# Patient Record
Sex: MALE | Race: WHITE | NOT HISPANIC OR LATINO | URBAN - METROPOLITAN AREA
[De-identification: names, ages, dates, MRNs, and addresses within clinical notes are randomized per-mention and may not be internally consistent; named-entity substitution may affect disease eponyms.]

---

## 2020-07-25 ENCOUNTER — TELEPHONE ENCOUNTER (OUTPATIENT)
Dept: URBAN - METROPOLITAN AREA CLINIC 13 | Facility: CLINIC | Age: 70
End: 2020-07-25

## 2020-07-26 ENCOUNTER — TELEPHONE ENCOUNTER (OUTPATIENT)
Dept: URBAN - METROPOLITAN AREA CLINIC 13 | Facility: CLINIC | Age: 70
End: 2020-07-26

## 2020-07-26 RX ORDER — ASPIRIN 81 MG/1
TAKE 3 TABLET WEEKLY TABLET ORAL
Refills: 0 | Status: ACTIVE | COMMUNITY
Start: 2016-07-13

## 2020-07-26 RX ORDER — FEXOFENADINE HYDROCHLORIDE 180 MG/1
TAKE 1 TABLET DAILY AS NEEDED TABLET, FILM COATED ORAL
Refills: 0 | Status: ACTIVE | COMMUNITY
Start: 2016-07-13

## 2020-07-26 RX ORDER — MONTELUKAST SODIUM 10 MG/1
TAKE 1 TABLET DAILY TABLET, FILM COATED ORAL
Refills: 0 | Status: ACTIVE | COMMUNITY

## 2021-09-10 ENCOUNTER — TELEPHONE ENCOUNTER (OUTPATIENT)
Dept: URBAN - METROPOLITAN AREA CLINIC 113 | Facility: CLINIC | Age: 71
End: 2021-09-10

## 2021-11-17 ENCOUNTER — LAB OUTSIDE AN ENCOUNTER (OUTPATIENT)
Dept: URBAN - METROPOLITAN AREA CLINIC 113 | Facility: CLINIC | Age: 71
End: 2021-11-17

## 2021-11-17 ENCOUNTER — TELEPHONE ENCOUNTER (OUTPATIENT)
Dept: URBAN - METROPOLITAN AREA CLINIC 113 | Facility: CLINIC | Age: 71
End: 2021-11-17

## 2021-11-17 VITALS — HEIGHT: 77 IN | BODY MASS INDEX: 26.57 KG/M2 | WEIGHT: 225 LBS

## 2021-11-17 RX ORDER — MONTELUKAST SODIUM 10 MG/1
TAKE 1 TABLET DAILY TABLET, FILM COATED ORAL
Refills: 0 | Status: ACTIVE | COMMUNITY

## 2021-11-17 RX ORDER — ASPIRIN 81 MG/1
TAKE 3 TABLET WEEKLY TABLET ORAL
Refills: 0 | Status: DISCONTINUED | COMMUNITY
Start: 2016-07-13

## 2021-11-17 RX ORDER — FEXOFENADINE HYDROCHLORIDE 180 MG/1
TAKE 1 TABLET DAILY AS NEEDED TABLET, FILM COATED ORAL
Refills: 0 | Status: ACTIVE | COMMUNITY
Start: 2016-07-13

## 2021-11-17 RX ORDER — AZELASTINE HYDROCHLORIDE AND FLUTICASONE PROPIONATE 137; 50 UG/1; UG/1
1 SPRAY IN EACH NOSTRIL SPRAY, METERED NASAL TWICE A DAY
Status: ACTIVE | COMMUNITY

## 2021-11-17 RX ORDER — OMEPRAZOLE 40 MG/1
1 CAPSULE 30 MINUTES BEFORE MORNING MEAL CAPSULE, DELAYED RELEASE ORAL ONCE A DAY
Status: ACTIVE | COMMUNITY

## 2021-11-17 RX ORDER — LISINOPRIL 5 MG/1
1 TABLET TABLET ORAL ONCE A DAY
Status: ACTIVE | COMMUNITY

## 2021-11-17 RX ORDER — POLYETHYLENE GLYCOL 3350, SODIUM CHLORIDE, SODIUM BICARBONATE, POTASSIUM CHLORIDE 420; 11.2; 5.72; 1.48 G/4L; G/4L; G/4L; G/4L
AS DIRECTED POWDER, FOR SOLUTION ORAL
Qty: 1 | Refills: 0 | OUTPATIENT
Start: 2021-11-17 | End: 2021-11-18

## 2022-01-24 ENCOUNTER — OFFICE VISIT (OUTPATIENT)
Dept: URBAN - METROPOLITAN AREA SURGERY CENTER 25 | Facility: SURGERY CENTER | Age: 72
End: 2022-01-24

## 2022-03-29 ENCOUNTER — OFFICE VISIT (OUTPATIENT)
Dept: URBAN - METROPOLITAN AREA SURGERY CENTER 25 | Facility: SURGERY CENTER | Age: 72
End: 2022-03-29

## 2022-05-02 ENCOUNTER — OFFICE VISIT (OUTPATIENT)
Dept: URBAN - METROPOLITAN AREA SURGERY CENTER 25 | Facility: SURGERY CENTER | Age: 72
End: 2022-05-02
Payer: MEDICARE

## 2022-05-02 DIAGNOSIS — Z86.010 ADENOMAS PERSONAL HISTORY OF COLONIC POLYPS: ICD-10-CM

## 2022-05-02 PROCEDURE — G8907 PT DOC NO EVENTS ON DISCHARG: HCPCS | Performed by: INTERNAL MEDICINE

## 2022-05-02 PROCEDURE — G0105 COLORECTAL SCRN; HI RISK IND: HCPCS | Performed by: INTERNAL MEDICINE

## 2022-05-16 ENCOUNTER — CLAIMS CREATED FROM THE CLAIM WINDOW (OUTPATIENT)
Dept: URBAN - METROPOLITAN AREA CLINIC 113 | Facility: CLINIC | Age: 72
End: 2022-05-16
Payer: MEDICARE

## 2022-05-16 VITALS
HEIGHT: 77 IN | TEMPERATURE: 98.2 F | SYSTOLIC BLOOD PRESSURE: 120 MMHG | BODY MASS INDEX: 28.22 KG/M2 | WEIGHT: 239 LBS | DIASTOLIC BLOOD PRESSURE: 74 MMHG | HEART RATE: 66 BPM

## 2022-05-16 DIAGNOSIS — Z86.010 PERSONAL HISTORY OF COLONIC POLYPS: ICD-10-CM

## 2022-05-16 DIAGNOSIS — K57.50 DIVERTICULOSIS OF BOTH SMALL AND LARGE INTESTINE: ICD-10-CM

## 2022-05-16 DIAGNOSIS — K64.8 INTERNAL HEMORRHOIDS: ICD-10-CM

## 2022-05-16 DIAGNOSIS — K57.90 DIVERTICULOSIS: ICD-10-CM

## 2022-05-16 PROBLEM — 398050005 DIVERTICULAR DISEASE OF COLON: Status: ACTIVE | Noted: 2022-05-16

## 2022-05-16 PROBLEM — 428283002 HISTORY OF POLYP OF COLON (SITUATION): Status: ACTIVE | Noted: 2021-11-17

## 2022-05-16 PROCEDURE — 99213 OFFICE O/P EST LOW 20 MIN: CPT | Performed by: NURSE PRACTITIONER

## 2022-05-16 RX ORDER — PANTOPRAZOLE SODIUM 40 MG/1
1 TABLET TABLET, DELAYED RELEASE ORAL ONCE A DAY
Status: ACTIVE | COMMUNITY

## 2022-05-16 RX ORDER — FEXOFENADINE HYDROCHLORIDE 180 MG/1
TAKE 1 TABLET DAILY AS NEEDED TABLET, FILM COATED ORAL
Refills: 0 | Status: ACTIVE | COMMUNITY
Start: 2016-07-13

## 2022-05-16 RX ORDER — MONTELUKAST SODIUM 10 MG/1
TAKE 1 TABLET DAILY TABLET, FILM COATED ORAL
Refills: 0 | Status: ACTIVE | COMMUNITY

## 2022-05-16 RX ORDER — LOSARTAN POTASSIUM 25 MG/1
1 TABLET TABLET ORAL ONCE A DAY
Status: ACTIVE | COMMUNITY

## 2022-05-16 NOTE — HPI-TODAY'S VISIT:
71-year-old male with a history of hypertension, adenomatous colon polyps, presenting for follow-up after surveillance colonoscopy. Colonoscopy 5/2/2022:Mild to moderate sigmoid diverticulosis, mild internal hemorrhoids, moderate external hemorrhoids.  Repeat colonoscopy recommended in 5 years. He is doing well from a GI stanpoint. His bowel habits are regular and he denies blood per rectum. No abdominal pain, nausea or vomiting.

## 2022-12-06 ENCOUNTER — WEB ENCOUNTER (OUTPATIENT)
Dept: URBAN - METROPOLITAN AREA CLINIC 113 | Facility: CLINIC | Age: 72
End: 2022-12-06

## 2022-12-28 ENCOUNTER — OFFICE VISIT (OUTPATIENT)
Dept: URBAN - METROPOLITAN AREA CLINIC 107 | Facility: CLINIC | Age: 72
End: 2022-12-28
Payer: MEDICARE

## 2022-12-28 VITALS
WEIGHT: 247 LBS | HEIGHT: 77 IN | DIASTOLIC BLOOD PRESSURE: 72 MMHG | BODY MASS INDEX: 29.16 KG/M2 | TEMPERATURE: 98.2 F | SYSTOLIC BLOOD PRESSURE: 125 MMHG | HEART RATE: 75 BPM

## 2022-12-28 DIAGNOSIS — E88.01 ALPHA-1-ANTITRYPSIN DEFICIENCY: ICD-10-CM

## 2022-12-28 DIAGNOSIS — R05.3 CHRONIC COUGH: ICD-10-CM

## 2022-12-28 DIAGNOSIS — K59.00 CONSTIPATION, UNSPECIFIED CONSTIPATION TYPE: ICD-10-CM

## 2022-12-28 DIAGNOSIS — K21.00 ALKALINE REFLUX ESOPHAGITIS: ICD-10-CM

## 2022-12-28 DIAGNOSIS — K64.8 INTERNAL HEMORRHOIDS WITH COMPLICATION: ICD-10-CM

## 2022-12-28 DIAGNOSIS — K21.9 GERD: ICD-10-CM

## 2022-12-28 DIAGNOSIS — K59.09 CHANGE IN BOWEL MOVEMENTS INTERMITTENT CONSTIPATION. URGENCY IN THE MORNING.: ICD-10-CM

## 2022-12-28 PROBLEM — 14760008 CONSTIPATION: Status: ACTIVE | Noted: 2022-12-28

## 2022-12-28 PROBLEM — 30188007 ALPHA-1-ANTITRYPSIN DEFICIENCY: Status: ACTIVE | Noted: 2022-12-28

## 2022-12-28 PROBLEM — 235595009 GASTROESOPHAGEAL REFLUX DISEASE: Status: ACTIVE | Noted: 2022-12-28

## 2022-12-28 PROCEDURE — 76700 US EXAM ABDOM COMPLETE: CPT | Performed by: NURSE PRACTITIONER

## 2022-12-28 PROCEDURE — 99214 OFFICE O/P EST MOD 30 MIN: CPT | Performed by: NURSE PRACTITIONER

## 2022-12-28 RX ORDER — LOSARTAN POTASSIUM 25 MG/1
1 TABLET TABLET ORAL ONCE A DAY
Status: ACTIVE | COMMUNITY

## 2022-12-28 RX ORDER — OMEPRAZOLE 40 MG/1
1 CAPSULE 30 MINUTES BEFORE MORNING MEAL CAPSULE, DELAYED RELEASE ORAL ONCE A DAY
Status: ACTIVE | COMMUNITY

## 2022-12-28 RX ORDER — FAMOTIDINE 40 MG/1
1 TABLET AT BEDTIME TABLET, FILM COATED ORAL ONCE A DAY
Status: ACTIVE | COMMUNITY

## 2022-12-28 RX ORDER — MONTELUKAST SODIUM 10 MG/1
TAKE 1 TABLET DAILY TABLET, FILM COATED ORAL
Refills: 0 | Status: ACTIVE | COMMUNITY

## 2022-12-28 RX ORDER — FEXOFENADINE HYDROCHLORIDE 180 MG/1
TAKE 1 TABLET DAILY AS NEEDED TABLET, FILM COATED ORAL
Refills: 0 | Status: ACTIVE | COMMUNITY
Start: 2016-07-13

## 2022-12-28 NOTE — HPI-TODAY'S VISIT:
72-year-old male with a history of hypertension, adenomatous colon polyps, presenting for evaluation of hemorrhoids. He presents today with multiple abdominal complaints and various questions. His priority regards a recent diagnosis of alpha-1 anti-trypsin deficiency, identified through routine testing after his sister was diagnosed. He has seen pulmonology and provides a negative chest xray aside from questionable changes of emphysema, and normal pulmonary function tests. He had labs on 12/19/22 which show normal LFTs with AST 23, ALT 23, , Tbili 0.2. Labs on 12/5/22 showed a low alpha-1 antitrypsin level of 63, phenotype SZ. He also complains of a chronic cough which has been ongoing for the last year. He is following with ENT, Dr. Real, and states the cough has been attributed to silent reflux. He has been on omeprazole 40mg daily for over a year without change. He started famotidien 40mg nightly about 2 weeks ago without much noticeable difference to this point. He has tried Allegra and Claritin without change. His Lisinopril was also stopped without improvement. He recently had an upper respiratory infection and feels he has some lingering symptoms related to this, so is unsure if recent medication changes have been helpful. The cough occurs throughout the day, but not at night when he is on his CPAP. The cough does not seem to be associated with meals. He has rare heartburn. No dysphagia. He does admit to a significant amount of postnasal drip. In addition, he has experienced intermittent hemorrhoid irritation over the last several months. About once per month, following a hard stool, he will experience burning perianal discomfort and a small volume of red blood with wiping. He will use preparation H and Tucks pads for a few days and the symptoms will subside. He tries to adhere to a high fiber diet, but is otherwise not on a bowel regimen.

## 2023-01-12 ENCOUNTER — WEB ENCOUNTER (OUTPATIENT)
Dept: URBAN - METROPOLITAN AREA CLINIC 113 | Facility: CLINIC | Age: 73
End: 2023-01-12

## 2023-03-30 ENCOUNTER — LAB OUTSIDE AN ENCOUNTER (OUTPATIENT)
Dept: URBAN - METROPOLITAN AREA CLINIC 113 | Facility: CLINIC | Age: 73
End: 2023-03-30

## 2023-03-30 ENCOUNTER — OFFICE VISIT (OUTPATIENT)
Dept: URBAN - METROPOLITAN AREA CLINIC 113 | Facility: CLINIC | Age: 73
End: 2023-03-30
Payer: MEDICARE

## 2023-03-30 VITALS
DIASTOLIC BLOOD PRESSURE: 77 MMHG | HEIGHT: 77 IN | HEART RATE: 72 BPM | RESPIRATION RATE: 18 BRPM | BODY MASS INDEX: 28.1 KG/M2 | WEIGHT: 238 LBS | TEMPERATURE: 97.3 F | SYSTOLIC BLOOD PRESSURE: 130 MMHG

## 2023-03-30 DIAGNOSIS — K21.9 GERD: ICD-10-CM

## 2023-03-30 DIAGNOSIS — K59.00 CONSTIPATION, UNSPECIFIED CONSTIPATION TYPE: ICD-10-CM

## 2023-03-30 DIAGNOSIS — E88.01 ALPHA-1-ANTITRYPSIN DEFICIENCY: ICD-10-CM

## 2023-03-30 DIAGNOSIS — Z86.010 PERSONAL HISTORY OF COLONIC POLYPS: ICD-10-CM

## 2023-03-30 DIAGNOSIS — K57.90 DIVERTICULOSIS: ICD-10-CM

## 2023-03-30 PROCEDURE — 99214 OFFICE O/P EST MOD 30 MIN: CPT | Performed by: INTERNAL MEDICINE

## 2023-03-30 RX ORDER — LOSARTAN POTASSIUM 25 MG/1
1 TABLET TABLET ORAL ONCE A DAY
Status: ACTIVE | COMMUNITY

## 2023-03-30 RX ORDER — FAMOTIDINE 40 MG/1
1 TABLET AT BEDTIME TABLET, FILM COATED ORAL ONCE A DAY
Status: ACTIVE | COMMUNITY

## 2023-03-30 RX ORDER — OMEPRAZOLE 40 MG/1
1 CAPSULE 30 MINUTES BEFORE MORNING MEAL CAPSULE, DELAYED RELEASE ORAL ONCE A DAY
Status: ACTIVE | COMMUNITY

## 2023-03-30 RX ORDER — FEXOFENADINE HYDROCHLORIDE 180 MG/1
TAKE 1 TABLET DAILY AS NEEDED TABLET, FILM COATED ORAL
Refills: 0 | Status: ACTIVE | COMMUNITY
Start: 2016-07-13

## 2023-03-30 RX ORDER — MONTELUKAST SODIUM 10 MG/1
TAKE 1 TABLET DAILY TABLET, FILM COATED ORAL
Refills: 0 | Status: ACTIVE | COMMUNITY

## 2023-03-30 NOTE — HPI-TODAY'S VISIT:
72-year-old male with a history of hypertension, adenomatous colon polyps, presenting for evaluation of alpha-1 anti-trypsin deficiency. . He has occasional regurgitation.  Occasional bloating.  No significant dysphagia.  Currently on omeprazole and famotidine.  No significant heartburn.  Weight is stable.  Appetite is fine.  We discussed his alpha-1 antitrypsin deficiency in detail.  Regular bowel movements.  He no rectal bleeding or melena at this time.  He does have a history of chronic cough.  He did not cough when he was in my office today. Include Location In Plan?: No Quality 224: Stage 0-Iic Melanoma: Overutilization Of Imaging Studies For Only Stage 0-Iic Melanoma: None of the following diagnostic imaging studies ordered: chest X-ray, CT, Ultrasound, MRI, PET, or nuclear medicine scans (ML) Detail Level: Simple Detail Level: Zone Quality 137: Melanoma: Continuity Of Care - Recall System: Patient information entered into a recall system that includes: target date for the next exam specified AND a process to follow up with patients regarding missed or unscheduled appointments

## 2023-03-30 NOTE — HPI-TODAY'S VISIT:
. CT chest February 2023.  Mild dependent opacities in the lung bases bilaterally suggestive of atelectasis.  Dilation of the main pulmonary artery which may be reflective of pulmonary hypertension.  Then thoracic aorta was 40 mm. . Abdominal ultrasound January 2023.  Mild diffuse heterogeneous hepatic echogenicity.  Elastography 6.37 kPA.  Either normal or consistent with mild fibrosis.  Small 11 mm benign-appearing right kidney echogenic focus likely nephrolithiasis versus small angiomyolipoma. . 12/19/22 which show normal LFTs with AST 23, ALT 23, , Tbili 0.2. Labs on 12/5/22 showed a low alpha-1 antitrypsin level of 63, phenotype SZ. . Labs December 5, 2022.  Alpha 1 antitrypsin level 63.  Low.  Normal is 101-187.  Alpha-1 antitrypsin phenotype SZ. . Labs December 19, 2022.  AST 23, ALT 23.  Alkaline phosphatase 106.  Total bilirubin 0.2.  Creatinine 1.1. . Chest x-ray December 2022.  Pulmonary hyper inflation suggesting emphysema.  Tortuous thoracic aorta. . Colonoscopy 5/2/2022:Mild to moderate sigmoid diverticulosis, mild internal hemorrhoids, moderate external hemorrhoids.  Repeat colonoscopy recommended in 5 years.

## 2023-06-01 ENCOUNTER — CLAIMS CREATED FROM THE CLAIM WINDOW (OUTPATIENT)
Dept: URBAN - METROPOLITAN AREA SURGERY CENTER 25 | Facility: SURGERY CENTER | Age: 73
End: 2023-06-01
Payer: MEDICARE

## 2023-06-01 ENCOUNTER — CLAIMS CREATED FROM THE CLAIM WINDOW (OUTPATIENT)
Dept: URBAN - METROPOLITAN AREA CLINIC 4 | Facility: CLINIC | Age: 73
End: 2023-06-01
Payer: MEDICARE

## 2023-06-01 DIAGNOSIS — K21.00 GASTRO-ESOPHAGEAL REFLUX DISEASE WITH ESOPHAGITIS, WITHOUT BLEEDING: ICD-10-CM

## 2023-06-01 DIAGNOSIS — R12 HEARTBURN: ICD-10-CM

## 2023-06-01 DIAGNOSIS — K44.9 DIAPHRAGMATIC HERNIA WITHOUT OBSTRUCTION OR GANGRENE: ICD-10-CM

## 2023-06-01 DIAGNOSIS — K21.00 ESOPHAGITIS, REFLUX: ICD-10-CM

## 2023-06-01 DIAGNOSIS — R05.3 CHRONIC COUGH: ICD-10-CM

## 2023-06-01 DIAGNOSIS — K31.89 OTHER DISEASES OF STOMACH AND DUODENUM: ICD-10-CM

## 2023-06-01 DIAGNOSIS — K29.70 GASTRITIS, UNSPECIFIED, WITHOUT BLEEDING: ICD-10-CM

## 2023-06-01 PROCEDURE — G8907 PT DOC NO EVENTS ON DISCHARG: HCPCS | Performed by: STUDENT IN AN ORGANIZED HEALTH CARE EDUCATION/TRAINING PROGRAM

## 2023-06-01 PROCEDURE — 99100 ANES PT EXTEME AGE<1 YR&>70: CPT | Performed by: ANESTHESIOLOGIST ASSISTANT

## 2023-06-01 PROCEDURE — 88305 TISSUE EXAM BY PATHOLOGIST: CPT | Performed by: PATHOLOGY

## 2023-06-01 PROCEDURE — 00731 ANES UPR GI NDSC PX NOS: CPT | Performed by: ANESTHESIOLOGIST ASSISTANT

## 2023-06-01 PROCEDURE — 88312 SPECIAL STAINS GROUP 1: CPT | Performed by: PATHOLOGY

## 2023-06-01 PROCEDURE — 43239 EGD BIOPSY SINGLE/MULTIPLE: CPT | Performed by: STUDENT IN AN ORGANIZED HEALTH CARE EDUCATION/TRAINING PROGRAM

## 2023-06-01 PROCEDURE — 00731 ANES UPR GI NDSC PX NOS: CPT | Performed by: ANESTHESIOLOGY

## 2023-06-01 PROCEDURE — 99100 ANES PT EXTEME AGE<1 YR&>70: CPT | Performed by: ANESTHESIOLOGY

## 2023-06-01 RX ORDER — LOSARTAN POTASSIUM 25 MG/1
1 TABLET TABLET ORAL ONCE A DAY
Status: ACTIVE | COMMUNITY

## 2023-06-01 RX ORDER — FEXOFENADINE HYDROCHLORIDE 180 MG/1
TAKE 1 TABLET DAILY AS NEEDED TABLET, FILM COATED ORAL
Refills: 0 | Status: ACTIVE | COMMUNITY
Start: 2016-07-13

## 2023-06-01 RX ORDER — FAMOTIDINE 40 MG/1
1 TABLET AT BEDTIME TABLET, FILM COATED ORAL ONCE A DAY
Status: ACTIVE | COMMUNITY

## 2023-06-01 RX ORDER — OMEPRAZOLE 40 MG/1
1 CAPSULE 30 MINUTES BEFORE MORNING MEAL CAPSULE, DELAYED RELEASE ORAL ONCE A DAY
Status: ACTIVE | COMMUNITY

## 2023-06-01 RX ORDER — MONTELUKAST SODIUM 10 MG/1
TAKE 1 TABLET DAILY TABLET, FILM COATED ORAL
Refills: 0 | Status: ACTIVE | COMMUNITY

## 2023-06-05 ENCOUNTER — OFFICE VISIT (OUTPATIENT)
Dept: URBAN - METROPOLITAN AREA CLINIC 112 | Facility: CLINIC | Age: 73
End: 2023-06-05

## 2023-06-05 ENCOUNTER — TELEPHONE ENCOUNTER (OUTPATIENT)
Dept: URBAN - METROPOLITAN AREA CLINIC 113 | Facility: CLINIC | Age: 73
End: 2023-06-05

## 2023-06-05 RX ORDER — MONTELUKAST SODIUM 10 MG/1
TAKE 1 TABLET DAILY TABLET, FILM COATED ORAL
Refills: 0 | Status: ACTIVE | COMMUNITY

## 2023-06-05 RX ORDER — OMEPRAZOLE 40 MG/1
1 CAPSULE 30 MINUTES BEFORE MORNING MEAL CAPSULE, DELAYED RELEASE ORAL ONCE A DAY
Status: ACTIVE | COMMUNITY

## 2023-06-05 RX ORDER — FAMOTIDINE 40 MG/1
1 TABLET AT BEDTIME TABLET, FILM COATED ORAL ONCE A DAY
Status: ACTIVE | COMMUNITY

## 2023-06-05 RX ORDER — FEXOFENADINE HYDROCHLORIDE 180 MG/1
TAKE 1 TABLET DAILY AS NEEDED TABLET, FILM COATED ORAL
Refills: 0 | Status: ACTIVE | COMMUNITY
Start: 2016-07-13

## 2023-06-05 RX ORDER — LOSARTAN POTASSIUM 25 MG/1
1 TABLET TABLET ORAL ONCE A DAY
Status: ACTIVE | COMMUNITY

## 2023-06-06 PROBLEM — 266433003: Status: ACTIVE | Noted: 2023-06-06

## 2023-06-14 ENCOUNTER — WEB ENCOUNTER (OUTPATIENT)
Dept: URBAN - METROPOLITAN AREA CLINIC 113 | Facility: CLINIC | Age: 73
End: 2023-06-14

## 2023-06-20 ENCOUNTER — OFFICE VISIT (OUTPATIENT)
Dept: URBAN - METROPOLITAN AREA CLINIC 113 | Facility: CLINIC | Age: 73
End: 2023-06-20
Payer: MEDICARE

## 2023-06-20 VITALS
SYSTOLIC BLOOD PRESSURE: 138 MMHG | WEIGHT: 236.8 LBS | TEMPERATURE: 97.1 F | DIASTOLIC BLOOD PRESSURE: 76 MMHG | HEIGHT: 77 IN | RESPIRATION RATE: 18 BRPM | BODY MASS INDEX: 27.96 KG/M2 | HEART RATE: 62 BPM

## 2023-06-20 DIAGNOSIS — K57.90 DIVERTICULOSIS: ICD-10-CM

## 2023-06-20 DIAGNOSIS — K44.9 HIATAL HERNIA: ICD-10-CM

## 2023-06-20 DIAGNOSIS — Z86.010 PERSONAL HISTORY OF COLONIC POLYPS: ICD-10-CM

## 2023-06-20 DIAGNOSIS — K21.9 GERD: ICD-10-CM

## 2023-06-20 PROCEDURE — 99214 OFFICE O/P EST MOD 30 MIN: CPT | Performed by: INTERNAL MEDICINE

## 2023-06-20 RX ORDER — FAMOTIDINE 40 MG/1
1 TABLET AT BEDTIME TABLET, FILM COATED ORAL ONCE A DAY
Status: ACTIVE | COMMUNITY

## 2023-06-20 RX ORDER — FEXOFENADINE HYDROCHLORIDE 180 MG/1
TAKE 1 TABLET DAILY AS NEEDED TABLET, FILM COATED ORAL
Refills: 0 | Status: ACTIVE | COMMUNITY
Start: 2016-07-13

## 2023-06-20 RX ORDER — OMEPRAZOLE 40 MG/1
1 CAPSULE 30 MINUTES BEFORE MORNING MEAL CAPSULE, DELAYED RELEASE ORAL ONCE A DAY
Status: ACTIVE | COMMUNITY

## 2023-06-20 RX ORDER — MONTELUKAST SODIUM 10 MG/1
TAKE 1 TABLET DAILY TABLET, FILM COATED ORAL
Refills: 0 | Status: ACTIVE | COMMUNITY

## 2023-06-20 RX ORDER — LOSARTAN POTASSIUM 25 MG/1
1 TABLET TABLET ORAL ONCE A DAY
Status: ACTIVE | COMMUNITY

## 2023-06-20 NOTE — HPI-TODAY'S VISIT:
. EGD Dr. Amador June 2023.  LA grade a reflux esophagitis.  2 cm hiatal hernia.  Regular Z-line.  Wide open lumen with hiatal hernia noted.  Gastric biopsies were negative for H. pylori.  Bravo results consistent with gastroesophageal reflux disease.  2-day study.  Day 1 DeMeester score 15.9, acid exposure time 5.8% in 24 hours.  Day 2.  Acid exposure time 9.8% total.  DeMeester score 28.6.  Summative score DeMeester score 23.4.  Average acid exposure time 7.7% over 2 days. . Barium swallow April 2023.  Normal.  No hiatal hernia evident. . CT chest February 2023.  Mild dependent opacities in the lung bases bilaterally suggestive of atelectasis.  Dilation of the main pulmonary artery which may be reflective of pulmonary hypertension.  Then thoracic aorta was 40 mm. . Abdominal ultrasound January 2023.  Mild diffuse heterogeneous hepatic echogenicity.  Elastography 6.37 kPA.  Either normal or consistent with mild fibrosis.  Small 11 mm benign-appearing right kidney echogenic focus likely nephrolithiasis versus small angiomyolipoma. . 12/19/22 which show normal LFTs with AST 23, ALT 23, , Tbili 0.2. Labs on 12/5/22 showed a low alpha-1 antitrypsin level of 63, phenotype SZ. . Labs December 5, 2022.  Alpha 1 antitrypsin level 63.  Low.  Normal is 101-187.  Alpha-1 antitrypsin phenotype SZ. . Labs December 19, 2022.  AST 23, ALT 23.  Alkaline phosphatase 106.  Total bilirubin 0.2.  Creatinine 1.1. . Chest x-ray December 2022.  Pulmonary hyper inflation suggesting emphysema.  Tortuous thoracic aorta. . Colonoscopy 5/2/2022:Mild to moderate sigmoid diverticulosis, mild internal hemorrhoids, moderate external hemorrhoids.  Repeat colonoscopy recommended in 5 years.

## 2023-06-20 NOTE — PHYSICAL EXAM NECK/THYROID:
normal appearance, without tenderness upon palpation, no deformities, trachea midline, no masses , no JVD , no lymphadenopathy. carotid pulse normal , normal appearance, without tenderness upon palpation, no deformities, trachea midline, no masses , no JVD , no lymphadenopathy. carotid pulse normal

## 2023-06-20 NOTE — PHYSICAL EXAM GASTROINTESTINAL
Abdomen , soft, nontender, nondistended, no guarding or rigidity, no masses palpable, normal bowel sounds. Liver and Spleen, no hepatomegaly present, no hepatosplenomegaly, liver nontender, spleen not palpable , Abdomen , soft, nontender, nondistended, no guarding or rigidity, no masses palpable, normal bowel sounds. Liver and Spleen, no hepatomegaly present, no hepatosplenomegaly, liver nontender, spleen not palpable

## 2023-06-20 NOTE — PHYSICAL EXAM HENT:
Head,  normocephalic,  atraumatic,  Face,  Face within normal limits,  Ears,  External ears within normal limits,  Nose/Nasopharynx,  External nose  normal appearance,  nares patent,  no nasal discharge,  Mouth and Throat,  Oral cavity appearance normal, Lips,  Appearance normal , Head,  normocephalic,  atraumatic,  Face,  Face within normal limits,  Ears,  External ears within normal limits,  Nose/Nasopharynx,  External nose  normal appearance,  nares patent,  no nasal discharge,  Mouth and Throat,  Oral cavity appearance normal, Lips,  Appearance normal

## 2023-06-20 NOTE — HPI-TODAY'S VISIT:
72-year-old male with a history of hypertension, adenomatous colon polyps, presenting for evaluation of alpha-1 anti-trypsin deficiency. . Regurgitation is better. Heartburn symptoms are improving. He is compliant with omeprazole and famotidine therapy. No significant dysphagia.  He has a hx of alpha-1 antitrypsin deficiency in detail.  At this time, the chronic cough seems to be improving. Clearly no worse.

## 2023-08-02 ENCOUNTER — WEB ENCOUNTER (OUTPATIENT)
Dept: URBAN - METROPOLITAN AREA CLINIC 113 | Facility: CLINIC | Age: 73
End: 2023-08-02

## 2023-10-19 ENCOUNTER — WEB ENCOUNTER (OUTPATIENT)
Dept: URBAN - METROPOLITAN AREA CLINIC 113 | Facility: CLINIC | Age: 73
End: 2023-10-19

## 2023-10-20 ENCOUNTER — WEB ENCOUNTER (OUTPATIENT)
Dept: URBAN - METROPOLITAN AREA CLINIC 113 | Facility: CLINIC | Age: 73
End: 2023-10-20

## 2023-12-11 ENCOUNTER — CLAIMS CREATED FROM THE CLAIM WINDOW (OUTPATIENT)
Dept: URBAN - METROPOLITAN AREA CLINIC 113 | Facility: CLINIC | Age: 73
End: 2023-12-11
Payer: MEDICARE

## 2023-12-11 ENCOUNTER — LAB OUTSIDE AN ENCOUNTER (OUTPATIENT)
Dept: URBAN - METROPOLITAN AREA CLINIC 113 | Facility: CLINIC | Age: 73
End: 2023-12-11

## 2023-12-11 VITALS
TEMPERATURE: 98.2 F | SYSTOLIC BLOOD PRESSURE: 147 MMHG | HEIGHT: 77 IN | HEART RATE: 67 BPM | BODY MASS INDEX: 28.46 KG/M2 | RESPIRATION RATE: 16 BRPM | DIASTOLIC BLOOD PRESSURE: 75 MMHG | WEIGHT: 241 LBS

## 2023-12-11 DIAGNOSIS — K57.90 DIVERTICULOSIS: ICD-10-CM

## 2023-12-11 DIAGNOSIS — K21.00 ESOPHAGITIS, REFLUX: ICD-10-CM

## 2023-12-11 DIAGNOSIS — Z86.010 PERSONAL HISTORY OF COLONIC POLYPS: ICD-10-CM

## 2023-12-11 DIAGNOSIS — K44.9 HIATAL HERNIA: ICD-10-CM

## 2023-12-11 DIAGNOSIS — K59.00 CONSTIPATION, UNSPECIFIED CONSTIPATION TYPE: ICD-10-CM

## 2023-12-11 DIAGNOSIS — K21.00 GASTRO-ESOPHAGEAL REFLUX DISEASE WITH ESOPHAGITIS, WITHOUT BLEEDING: ICD-10-CM

## 2023-12-11 DIAGNOSIS — K21.9 GERD: ICD-10-CM

## 2023-12-11 DIAGNOSIS — E88.01 ALPHA-1-ANTITRYPSIN DEFICIENCY: ICD-10-CM

## 2023-12-11 PROCEDURE — 99214 OFFICE O/P EST MOD 30 MIN: CPT | Performed by: INTERNAL MEDICINE

## 2023-12-11 RX ORDER — MONTELUKAST SODIUM 10 MG/1
TAKE 1 TABLET DAILY TABLET, FILM COATED ORAL
Refills: 0 | Status: ACTIVE | COMMUNITY

## 2023-12-11 RX ORDER — FAMOTIDINE 40 MG/1
1 TABLET AT BEDTIME TABLET, FILM COATED ORAL ONCE A DAY
Status: ACTIVE | COMMUNITY

## 2023-12-11 RX ORDER — FEXOFENADINE HYDROCHLORIDE 180 MG/1
TAKE 1 TABLET DAILY AS NEEDED TABLET, FILM COATED ORAL
Refills: 0 | Status: ACTIVE | COMMUNITY
Start: 2016-07-13

## 2023-12-11 RX ORDER — OMEPRAZOLE 40 MG/1
1 CAPSULE 30 MINUTES BEFORE MORNING MEAL CAPSULE, DELAYED RELEASE ORAL ONCE A DAY
Status: ACTIVE | COMMUNITY

## 2023-12-11 RX ORDER — LOSARTAN POTASSIUM 25 MG/1
1 TABLET TABLET ORAL ONCE A DAY
Status: ACTIVE | COMMUNITY

## 2023-12-11 RX ORDER — PANTOPRAZOLE 40 MG/1
TAKE 1 TABLET DAILY TABLET, DELAYED RELEASE ORAL TWICE DAILY
Qty: 180 | Refills: 3 | OUTPATIENT
Start: 2023-12-11

## 2023-12-11 NOTE — HPI-TODAY'S VISIT:
73-year-old male with a history of hypertension, adenomatous colon polyps, presenting for evaluation of alpha-1 anti-trypsin deficiency.  He continues to have issues with regurgitation. He also has issues with cough. He has cough when he removes his CPAP at night. No heartburn. No dysphagia. Weight is stable. Appetite is fine. No diarrhea or constipation. No rectal bleeding at this time. Occasionally some mild blood on the toilet paper. He has hemorrhoidal irritation. Using Tucks pads. Once again, bowel movements are regular. .

## 2023-12-11 NOTE — HPI-TODAY'S VISIT:
. EGD Dr. Amador June 2023.  LA grade a reflux esophagitis.  2 cm hiatal hernia.  Regular Z-line.  Wide open lumen with hiatal hernia noted.  Gastric biopsies were negative for H. pylori.  Bravo results consistent with gastroesophageal reflux disease.  2-day study.  Day 1 DeMeester score 15.9, acid exposure time 5.8% in 24 hours.  Day 2.  Acid exposure time 9.8% total.  DeMeester score 28.6.  Summative score DeMeester score 23.4.  Average acid exposure time 7.7% over 2 days. . Barium swallow April 2023.  Normal.  No hiatal hernia evident. . CT chest February 2023.  Mild dependent opacities in the lung bases bilaterally suggestive of atelectasis.  Dilation of the main pulmonary artery which may be reflective of pulmonary hypertension.  Then thoracic aorta was 40 mm. . Abdominal ultrasound January 2023.  Mild diffuse heterogeneous hepatic echogenicity.  Elastography 6.37 kPA.  Either normal or consistent with mild fibrosis.  Small 11 mm benign-appearing right kidney echogenic focus likely nephrolithiasis versus small angiomyolipoma. . 12/19/22 which show normal LFTs with AST 23, ALT 23, , Tbili 0.2. Labs on 12/5/22 showed a low alpha-1 antitrypsin level of 63, phenotype SZ. . Labs December 5, 2022.  Alpha 1 antitrypsin level 63.  Low.  Normal is 101-187.  Alpha-1 antitrypsin phenotype SZ. . Labs December 19, 2022.  AST 23, ALT 23.  Alkaline phosphatase 106.  Total bilirubin 0.2.  Creatinine 1.1. . Chest x-ray December 2022.  Pulmonary hyper inflation suggesting emphysema.  Tortuous thoracic aorta. . Colonoscopy 5/2/2022:Mild to moderate sigmoid diverticulosis, mild internal hemorrhoids, moderate external hemorrhoids.  Repeat colonoscopy recommended in 5 years.  Colonoscopy 2018.  5 mm sessile ascending colon polyp removed by cold snare.  3 mm sessile sigmoid polyp removed by cold biopsy.  Moderate sigmoid diverticulosis.  Biopsies were notable for hyperplastic polyp.  Colonoscopy 2016 4 distinct 10 mm - 20 mm sessile ascending colon polyp removed by cold snare.  Mild sigmoid diverticulosis.  Biopsies were notable for tubular adenomas.  Colonoscopy 2006.  Mild sigmoid diverticulosis otherwise normal  Laparoscopic cholecystectomy November 2, 1999.

## 2023-12-19 ENCOUNTER — TELEPHONE ENCOUNTER (OUTPATIENT)
Dept: URBAN - METROPOLITAN AREA CLINIC 113 | Facility: CLINIC | Age: 73
End: 2023-12-19

## 2023-12-21 ENCOUNTER — TELEPHONE ENCOUNTER (OUTPATIENT)
Dept: URBAN - METROPOLITAN AREA CLINIC 113 | Facility: CLINIC | Age: 73
End: 2023-12-21

## 2024-01-16 ENCOUNTER — CLAIMS CREATED FROM THE CLAIM WINDOW (OUTPATIENT)
Dept: URBAN - METROPOLITAN AREA MEDICAL CENTER 2 | Facility: MEDICAL CENTER | Age: 74
End: 2024-01-16
Payer: MEDICARE

## 2024-01-16 DIAGNOSIS — K21.9 GASTRO-ESOPHAGEAL REFLUX DISEASE WITHOUT ESOPHAGITIS: ICD-10-CM

## 2024-01-16 PROCEDURE — 91010 ESOPHAGUS MOTILITY STUDY: CPT | Performed by: INTERNAL MEDICINE

## 2024-01-17 ENCOUNTER — TELEPHONE ENCOUNTER (OUTPATIENT)
Dept: URBAN - METROPOLITAN AREA CLINIC 113 | Facility: CLINIC | Age: 74
End: 2024-01-17

## 2024-03-14 ENCOUNTER — OV EP (OUTPATIENT)
Dept: URBAN - METROPOLITAN AREA CLINIC 113 | Facility: CLINIC | Age: 74
End: 2024-03-14
Payer: MEDICARE

## 2024-03-14 VITALS
HEIGHT: 77 IN | DIASTOLIC BLOOD PRESSURE: 71 MMHG | HEART RATE: 65 BPM | WEIGHT: 243 LBS | SYSTOLIC BLOOD PRESSURE: 133 MMHG | TEMPERATURE: 97.5 F | BODY MASS INDEX: 28.69 KG/M2 | RESPIRATION RATE: 14 BRPM

## 2024-03-14 DIAGNOSIS — E88.01 ALPHA-1-ANTITRYPSIN DEFICIENCY: ICD-10-CM

## 2024-03-14 DIAGNOSIS — K21.00 ESOPHAGITIS, REFLUX: ICD-10-CM

## 2024-03-14 DIAGNOSIS — K59.00 CONSTIPATION, UNSPECIFIED CONSTIPATION TYPE: ICD-10-CM

## 2024-03-14 DIAGNOSIS — K21.00 GASTRO-ESOPHAGEAL REFLUX DISEASE WITH ESOPHAGITIS, WITHOUT BLEEDING: ICD-10-CM

## 2024-03-14 DIAGNOSIS — Z86.010 PERSONAL HISTORY OF COLONIC POLYPS: ICD-10-CM

## 2024-03-14 DIAGNOSIS — K57.90 DIVERTICULOSIS: ICD-10-CM

## 2024-03-14 DIAGNOSIS — K44.9 HIATAL HERNIA: ICD-10-CM

## 2024-03-14 DIAGNOSIS — K21.9 GERD: ICD-10-CM

## 2024-03-14 PROCEDURE — 99214 OFFICE O/P EST MOD 30 MIN: CPT | Performed by: INTERNAL MEDICINE

## 2024-03-14 RX ORDER — FAMOTIDINE 40 MG/1
1 TABLET AT BEDTIME TABLET, FILM COATED ORAL ONCE A DAY
Status: ACTIVE | COMMUNITY

## 2024-03-14 RX ORDER — PANTOPRAZOLE 40 MG/1
TAKE 1 TABLET DAILY TABLET, DELAYED RELEASE ORAL TWICE DAILY
Qty: 180 | Refills: 3 | Status: ACTIVE | COMMUNITY
Start: 2023-12-11

## 2024-03-14 RX ORDER — LOSARTAN POTASSIUM 25 MG/1
1 TABLET TABLET ORAL ONCE A DAY
Status: ACTIVE | COMMUNITY

## 2024-03-14 RX ORDER — OMEPRAZOLE 40 MG/1
1 CAPSULE 30 MINUTES BEFORE MORNING MEAL CAPSULE, DELAYED RELEASE ORAL ONCE A DAY
Status: ACTIVE | COMMUNITY

## 2024-03-14 RX ORDER — FEXOFENADINE HYDROCHLORIDE 180 MG/1
TAKE 1 TABLET DAILY AS NEEDED TABLET, FILM COATED ORAL
Refills: 0 | Status: ACTIVE | COMMUNITY
Start: 2016-07-13

## 2024-03-14 RX ORDER — MONTELUKAST SODIUM 10 MG/1
TAKE 1 TABLET DAILY TABLET, FILM COATED ORAL
Refills: 0 | Status: ACTIVE | COMMUNITY

## 2024-03-14 NOTE — HPI-TODAY'S VISIT:
73-year-old male with a history of hypertension, adenomatous colon polyps, presenting for evaluation of alpha-1 anti-trypsin deficiency.  At this time, taking pantoprazole twice daily he is doing much better. He is not having cough. No significant regurgitation. Pulmonary status is stable. No dysphagia. No significant heartburn. He wears CPAP. Regular bowel movements. No rectal bleeding or melena. No abdominal pain. No chest pain. He has seen surgery.

## 2024-03-14 NOTE — HPI-TODAY'S VISIT:
. Laparoscopic cholecystectomy November 2, 1999.  Esophageal manometry January 2024. Mild ineffective esophageal motility. Low lower esophageal residual pressure.  Gastric emptying study January 2024. Normal  Labs November 2023. AST 25, ALT 26. Creatinine 1.0. Hemoglobin 14.0.  EGD Dr. Amador June 2023.  LA grade a reflux esophagitis.  2 cm hiatal hernia.  Regular Z-line.  Wide open lumen with hiatal hernia noted.  Gastric biopsies were negative for H. pylori.  Bravo results consistent with gastroesophageal reflux disease.  2-day study.  Day 1 DeMeester score 15.9, acid exposure time 5.8% in 24 hours.  Day 2.  Acid exposure time 9.8% total.  DeMeester score 28.6.  Summative score DeMeester score 23.4.  Average acid exposure time 7.7% over 2 days.  Barium swallow April 2023.  Normal.  No hiatal hernia evident.  CT chest February 2023.  Mild dependent opacities in the lung bases bilaterally suggestive of atelectasis.  Dilation of the main pulmonary artery which may be reflective of pulmonary hypertension.  Then thoracic aorta was 40 mm.  Abdominal ultrasound January 2023.  Mild diffuse heterogeneous hepatic echogenicity.  Elastography 6.37 kPA.  Either normal or consistent with mild fibrosis.  Small 11 mm benign-appearing right kidney echogenic focus likely nephrolithiasis versus small angiomyolipoma.  12/19/22 which show normal LFTs with AST 23, ALT 23, , Tbili 0.2. Labs on 12/5/22 showed a low alpha-1 antitrypsin level of 63, phenotype SZ.  Labs December 5, 2022.  Alpha 1 antitrypsin level 63.  Low.  Normal is 101-187.  Alpha-1 antitrypsin phenotype SZ.  Labs December 19, 2022.  AST 23, ALT 23.  Alkaline phosphatase 106.  Total bilirubin 0.2.  Creatinine 1.1.  Chest x-ray December 2022.  Pulmonary hyper inflation suggesting emphysema.  Tortuous thoracic aorta.  Colonoscopy 5/2/2022:Mild to moderate sigmoid diverticulosis, mild internal hemorrhoids, moderate external hemorrhoids.  Repeat colonoscopy recommended in 5 years.  Colonoscopy 2018.  5 mm sessile ascending colon polyp removed by cold snare.  3 mm sessile sigmoid polyp removed by cold biopsy.  Moderate sigmoid diverticulosis.  Biopsies were notable for hyperplastic polyp.  Colonoscopy 2016 4 distinct 10 mm - 20 mm sessile ascending colon polyp removed by cold snare.  Mild sigmoid diverticulosis.  Biopsies were notable for tubular adenomas.  Colonoscopy 2006.  Mild sigmoid diverticulosis otherwise normal

## 2024-09-16 ENCOUNTER — OFFICE VISIT (OUTPATIENT)
Dept: URBAN - METROPOLITAN AREA CLINIC 113 | Facility: CLINIC | Age: 74
End: 2024-09-16
Payer: MEDICARE

## 2024-09-16 ENCOUNTER — OFFICE VISIT (OUTPATIENT)
Dept: URBAN - METROPOLITAN AREA CLINIC 113 | Facility: CLINIC | Age: 74
End: 2024-09-16

## 2024-09-16 ENCOUNTER — DASHBOARD ENCOUNTERS (OUTPATIENT)
Age: 74
End: 2024-09-16

## 2024-09-16 VITALS
HEIGHT: 77 IN | DIASTOLIC BLOOD PRESSURE: 79 MMHG | TEMPERATURE: 97.7 F | BODY MASS INDEX: 28.62 KG/M2 | SYSTOLIC BLOOD PRESSURE: 135 MMHG | WEIGHT: 242.4 LBS | RESPIRATION RATE: 18 BRPM | HEART RATE: 71 BPM

## 2024-09-16 DIAGNOSIS — K21.9 GERD: ICD-10-CM

## 2024-09-16 DIAGNOSIS — E88.01 ALPHA-1-ANTITRYPSIN DEFICIENCY: ICD-10-CM

## 2024-09-16 DIAGNOSIS — Z86.010 PERSONAL HISTORY OF COLONIC POLYPS: ICD-10-CM

## 2024-09-16 DIAGNOSIS — K57.90 DIVERTICULOSIS: ICD-10-CM

## 2024-09-16 DIAGNOSIS — K59.00 CONSTIPATION, UNSPECIFIED CONSTIPATION TYPE: ICD-10-CM

## 2024-09-16 DIAGNOSIS — K44.9 HIATAL HERNIA: ICD-10-CM

## 2024-09-16 PROCEDURE — 99213 OFFICE O/P EST LOW 20 MIN: CPT | Performed by: INTERNAL MEDICINE

## 2024-09-16 RX ORDER — LOSARTAN POTASSIUM 25 MG/1
1 TABLET TABLET ORAL ONCE A DAY
Status: ACTIVE | COMMUNITY

## 2024-09-16 RX ORDER — PANTOPRAZOLE 40 MG/1
TAKE 1 TABLET DAILY TABLET, DELAYED RELEASE ORAL TWICE DAILY
Qty: 180 | Refills: 3 | Status: ACTIVE | COMMUNITY
Start: 2023-12-11

## 2024-09-16 RX ORDER — FAMOTIDINE 40 MG/1
1 TABLET AT BEDTIME TABLET, FILM COATED ORAL ONCE A DAY
Status: ACTIVE | COMMUNITY

## 2024-09-16 RX ORDER — FEXOFENADINE HYDROCHLORIDE 180 MG/1
TAKE 1 TABLET DAILY AS NEEDED TABLET, FILM COATED ORAL
Refills: 0 | Status: ACTIVE | COMMUNITY
Start: 2016-07-13

## 2024-09-16 RX ORDER — MONTELUKAST SODIUM 10 MG/1
TAKE 1 TABLET DAILY TABLET, FILM COATED ORAL
Refills: 0 | Status: ACTIVE | COMMUNITY

## 2024-09-16 RX ORDER — OMEPRAZOLE 40 MG/1
1 CAPSULE 30 MINUTES BEFORE MORNING MEAL CAPSULE, DELAYED RELEASE ORAL ONCE A DAY
Status: ACTIVE | COMMUNITY

## 2024-09-16 NOTE — HPI-TODAY'S VISIT:
73-year-old male with a history of hypertension, adenomatous colon polyps, presenting for follow up of GERD.  He is doing much better. He is on pantoprazole twice daily and famotidine at nighttime. Cough is better. He thinks the cough is due to postnasal drip. He is not have any issues with shortness of breath. No pulmonary problems at this time. No dysphagia heartburn or regurgitation. No abdominal pain. No diarrhea or constipation. No rectal bleeding or melena.  He wears CPAP. Regular bowel movements.   Occupation: Retired . Served in [employer]. Worked at [employer]

## 2024-09-16 NOTE — PHYSICAL EXAM LUNGS:
clear to auscultation bilaterally, good air movement trace b/l LE pitting edema  bibasilar crackles  diffuse b-lines on unofficial bedside sono  RN findlator chaperone: 99.8 rectal temp, brown stool

## 2024-09-16 NOTE — HPI-TODAY'S VISIT:
Esophageal manometry January 2024. Mild ineffective esophageal motility. Low lower esophageal residual pressure.  Gastric emptying study January 2024. Normal  Labs November 2023. AST 25, ALT 26. Creatinine 1.0. Hemoglobin 14.0.  EGD Dr. Amador June 2023.  LA grade a reflux esophagitis.  2 cm hiatal hernia.  Regular Z-line.  Wide open lumen with hiatal hernia noted.  Gastric biopsies were negative for H. pylori.  Bravo results consistent with gastroesophageal reflux disease.  2-day study.  Day 1 DeMeester score 15.9, acid exposure time 5.8% in 24 hours.  Day 2.  Acid exposure time 9.8% total.  DeMeester score 28.6.  Summative score DeMeester score 23.4.  Average acid exposure time 7.7% over 2 days.  Barium swallow April 2023.  Normal.  No hiatal hernia evident.  CT chest February 2023.  Mild dependent opacities in the lung bases bilaterally suggestive of atelectasis.  Dilation of the main pulmonary artery which may be reflective of pulmonary hypertension.  Then thoracic aorta was 40 mm.  Abdominal ultrasound January 2023.  Mild diffuse heterogeneous hepatic echogenicity.  Elastography 6.37 kPA.  Either normal or consistent with mild fibrosis.  Small 11 mm benign-appearing right kidney echogenic focus likely nephrolithiasis versus small angiomyolipoma.  12/19/22 which show normal LFTs with AST 23, ALT 23, , Tbili 0.2. Labs on 12/5/22 showed a low alpha-1 antitrypsin level of 63, phenotype SZ.  Labs December 5, 2022.  Alpha 1 antitrypsin level 63.  Low.  Normal is 101-187.  Alpha-1 antitrypsin phenotype SZ.  Labs December 19, 2022.  AST 23, ALT 23.  Alkaline phosphatase 106.  Total bilirubin 0.2.  Creatinine 1.1.  Chest x-ray December 2022.  Pulmonary hyper inflation suggesting emphysema.  Tortuous thoracic aorta.  Colonoscopy 5/2/2022:Mild to moderate sigmoid diverticulosis, mild internal hemorrhoids, moderate external hemorrhoids.  Repeat colonoscopy recommended in 5 years.  Colonoscopy 2018.  5 mm sessile ascending colon polyp removed by cold snare.  3 mm sessile sigmoid polyp removed by cold biopsy.  Moderate sigmoid diverticulosis.  Biopsies were notable for hyperplastic polyp.  Colonoscopy 2016 4 distinct 10 mm - 20 mm sessile ascending colon polyp removed by cold snare.  Mild sigmoid diverticulosis.  Biopsies were notable for tubular adenomas.  Colonoscopy 2006.  Mild sigmoid diverticulosis otherwise normal  Laparoscopic cholecystectomy November 2, 1999.

## 2024-11-12 ENCOUNTER — OFFICE VISIT (OUTPATIENT)
Dept: URBAN - METROPOLITAN AREA CLINIC 113 | Facility: CLINIC | Age: 74
End: 2024-11-12

## 2024-12-03 ENCOUNTER — ERX REFILL RESPONSE (OUTPATIENT)
Dept: URBAN - METROPOLITAN AREA CLINIC 113 | Facility: CLINIC | Age: 74
End: 2024-12-03

## 2024-12-03 RX ORDER — PANTOPRAZOLE 40 MG/1
TAKE 1 TABLET DAILY TABLET, DELAYED RELEASE ORAL TWICE DAILY
Qty: 180 | Refills: 3 | OUTPATIENT

## 2024-12-03 RX ORDER — PANTOPRAZOLE 40 MG/1
TAKE ONE TABLET BY MOUTH TWICE A DAY TABLET, DELAYED RELEASE ORAL
Qty: 180 TABLET | Refills: 3 | OUTPATIENT

## 2025-02-27 ENCOUNTER — OFFICE VISIT (OUTPATIENT)
Dept: URBAN - METROPOLITAN AREA CLINIC 113 | Facility: CLINIC | Age: 75
End: 2025-02-27
Payer: MEDICARE

## 2025-02-27 VITALS
RESPIRATION RATE: 18 BRPM | SYSTOLIC BLOOD PRESSURE: 125 MMHG | HEART RATE: 64 BPM | DIASTOLIC BLOOD PRESSURE: 72 MMHG | BODY MASS INDEX: 28.41 KG/M2 | HEIGHT: 77 IN | TEMPERATURE: 97 F | WEIGHT: 240.6 LBS

## 2025-02-27 DIAGNOSIS — K21.9 GERD: ICD-10-CM

## 2025-02-27 PROCEDURE — 99213 OFFICE O/P EST LOW 20 MIN: CPT | Performed by: NURSE PRACTITIONER

## 2025-02-27 RX ORDER — PANTOPRAZOLE 40 MG/1
TAKE ONE TABLET BY MOUTH TWICE A DAY TABLET, DELAYED RELEASE ORAL
Qty: 180 TABLET | Refills: 3 | Status: ACTIVE | COMMUNITY

## 2025-02-27 RX ORDER — BENZONATATE 100 MG/1
1 CAPSULE AS NEEDED CAPSULE ORAL THREE TIMES A DAY
Status: ACTIVE | COMMUNITY

## 2025-02-27 RX ORDER — FEXOFENADINE HYDROCHLORIDE 180 MG/1
TAKE 1 TABLET DAILY AS NEEDED TABLET, FILM COATED ORAL
Refills: 0 | Status: ACTIVE | COMMUNITY
Start: 2016-07-13

## 2025-02-27 RX ORDER — MONTELUKAST SODIUM 10 MG/1
TAKE 1 TABLET DAILY TABLET, FILM COATED ORAL
Refills: 0 | Status: ACTIVE | COMMUNITY

## 2025-02-27 RX ORDER — FAMOTIDINE 40 MG/1
1 TABLET AT BEDTIME TABLET, FILM COATED ORAL ONCE A DAY
Status: ACTIVE | COMMUNITY

## 2025-02-27 RX ORDER — FAMOTIDINE 40 MG/1
1 TABLET AT BEDTIME TABLET, FILM COATED ORAL ONCE A DAY
OUTPATIENT

## 2025-02-27 RX ORDER — PANTOPRAZOLE 40 MG/1
TAKE ONE TABLET BY MOUTH TWICE A DAY TABLET, DELAYED RELEASE ORAL
OUTPATIENT

## 2025-02-27 RX ORDER — LOSARTAN POTASSIUM 25 MG/1
1 TABLET TABLET ORAL ONCE A DAY
Status: ACTIVE | COMMUNITY

## 2025-02-27 NOTE — HPI-TODAY'S VISIT:
74-year-old male with a history of hypertension, alpha 1 antitrypsin deficiency (Dr. Palmer), adenomatous colon polyps, presenting for follow up of GERD. He was seen in the office in September for follow-up regarding GERD, hiatal hernia and chronic cough. His symptoms were stable on his regimen with twice daily pantoprazole and famotidine prior to bedtime. His GERD symptoms remain well managed with pantoprazole and famotidine. He does not experience any breakthrough heartburn or regurgitation. No dysphagia. He has an occasional cough which he attributes to postnasal drip. Labs in June 2024 with his PCP show normal LFTs, AST 26, ALT 30, ALP 86, Tbili 0.5.